# Patient Record
Sex: MALE | Race: WHITE | Employment: OTHER | URBAN - METROPOLITAN AREA
[De-identification: names, ages, dates, MRNs, and addresses within clinical notes are randomized per-mention and may not be internally consistent; named-entity substitution may affect disease eponyms.]

---

## 2017-04-18 PROBLEM — R30.0 DYSURIA: Status: ACTIVE | Noted: 2017-04-18

## 2017-04-18 PROBLEM — R97.20 ELEVATED PROSTATE SPECIFIC ANTIGEN (PSA): Status: ACTIVE | Noted: 2017-04-18

## 2017-04-18 PROBLEM — N52.9 IMPOTENCE OF ORGANIC ORIGIN: Status: ACTIVE | Noted: 2017-04-18

## 2017-04-18 PROBLEM — N39.0 URINARY TRACT INFECTION, SITE NOT SPECIFIED: Status: ACTIVE | Noted: 2017-04-18

## 2017-04-18 PROBLEM — N20.0 CALCULUS OF KIDNEY: Status: ACTIVE | Noted: 2017-04-18

## 2017-04-18 PROBLEM — R33.9 RETENTION OF URINE, UNSPECIFIED: Status: ACTIVE | Noted: 2017-04-18

## 2017-04-18 PROBLEM — R31.0 GROSS HEMATURIA: Status: ACTIVE | Noted: 2017-04-18

## 2017-04-18 PROBLEM — N39.41 URGE INCONTINENCE: Status: ACTIVE | Noted: 2017-04-18

## 2017-04-18 PROBLEM — N40.1 BPH WITH OBSTRUCTION/LOWER URINARY TRACT SYMPTOMS: Status: ACTIVE | Noted: 2017-04-18

## 2017-04-18 PROBLEM — N13.8 BPH WITH OBSTRUCTION/LOWER URINARY TRACT SYMPTOMS: Status: ACTIVE | Noted: 2017-04-18

## 2017-04-18 PROBLEM — R10.9 ABDOMINAL PAIN, UNSPECIFIED SITE: Status: ACTIVE | Noted: 2017-04-18

## 2017-04-18 PROBLEM — R35.1 NOCTURIA: Status: ACTIVE | Noted: 2017-04-18

## 2018-04-12 PROBLEM — N39.0 URINARY TRACT INFECTION: Status: RESOLVED | Noted: 2017-04-18 | Resolved: 2018-04-12

## 2020-02-05 ENCOUNTER — APPOINTMENT (OUTPATIENT)
Dept: CT IMAGING | Age: 80
End: 2020-02-05
Payer: MEDICARE

## 2020-02-05 ENCOUNTER — HOSPITAL ENCOUNTER (EMERGENCY)
Age: 80
Discharge: PSYCHIATRIC OR SUBSTANCE USE FACILITY | End: 2020-02-06
Attending: EMERGENCY MEDICINE
Payer: MEDICARE

## 2020-02-05 LAB
A/G RATIO: 1.4 (ref 1.1–2.2)
ALBUMIN SERPL-MCNC: 4.2 G/DL (ref 3.4–5)
ALP BLD-CCNC: 86 U/L (ref 40–129)
ALT SERPL-CCNC: 8 U/L (ref 10–40)
AMPHETAMINE SCREEN, URINE: NORMAL
ANION GAP SERPL CALCULATED.3IONS-SCNC: 8 MMOL/L (ref 3–16)
AST SERPL-CCNC: 18 U/L (ref 15–37)
BARBITURATE SCREEN URINE: NORMAL
BASOPHILS ABSOLUTE: 0 K/UL (ref 0–0.2)
BASOPHILS RELATIVE PERCENT: 0.4 %
BENZODIAZEPINE SCREEN, URINE: NORMAL
BILIRUB SERPL-MCNC: 1 MG/DL (ref 0–1)
BILIRUBIN URINE: NEGATIVE
BLOOD, URINE: NEGATIVE
BUN BLDV-MCNC: 24 MG/DL (ref 7–20)
CALCIUM SERPL-MCNC: 9.7 MG/DL (ref 8.3–10.6)
CANNABINOID SCREEN URINE: NORMAL
CHLORIDE BLD-SCNC: 99 MMOL/L (ref 99–110)
CLARITY: CLEAR
CO2: 27 MMOL/L (ref 21–32)
COCAINE METABOLITE SCREEN URINE: NORMAL
COLOR: YELLOW
CREAT SERPL-MCNC: 1.3 MG/DL (ref 0.8–1.3)
EOSINOPHILS ABSOLUTE: 0.3 K/UL (ref 0–0.6)
EOSINOPHILS RELATIVE PERCENT: 3.2 %
GFR AFRICAN AMERICAN: >60
GFR NON-AFRICAN AMERICAN: 53
GLOBULIN: 2.9 G/DL
GLUCOSE BLD-MCNC: 114 MG/DL (ref 70–99)
GLUCOSE URINE: NEGATIVE MG/DL
HCT VFR BLD CALC: 40.4 % (ref 40.5–52.5)
HEMOGLOBIN: 13.4 G/DL (ref 13.5–17.5)
KETONES, URINE: NEGATIVE MG/DL
LEUKOCYTE ESTERASE, URINE: NEGATIVE
LYMPHOCYTES ABSOLUTE: 0.9 K/UL (ref 1–5.1)
LYMPHOCYTES RELATIVE PERCENT: 11 %
Lab: NORMAL
MCH RBC QN AUTO: 29.5 PG (ref 26–34)
MCHC RBC AUTO-ENTMCNC: 33.3 G/DL (ref 31–36)
MCV RBC AUTO: 88.7 FL (ref 80–100)
METHADONE SCREEN, URINE: NORMAL
MICROSCOPIC EXAMINATION: NORMAL
MONOCYTES ABSOLUTE: 0.6 K/UL (ref 0–1.3)
MONOCYTES RELATIVE PERCENT: 6.8 %
NEUTROPHILS ABSOLUTE: 6.5 K/UL (ref 1.7–7.7)
NEUTROPHILS RELATIVE PERCENT: 78.6 %
NITRITE, URINE: NEGATIVE
OPIATE SCREEN URINE: NORMAL
OXYCODONE URINE: NORMAL
PDW BLD-RTO: 14.8 % (ref 12.4–15.4)
PH UA: 7
PH UA: 7 (ref 5–8)
PHENCYCLIDINE SCREEN URINE: NORMAL
PLATELET # BLD: 214 K/UL (ref 135–450)
PMV BLD AUTO: 7.8 FL (ref 5–10.5)
POTASSIUM REFLEX MAGNESIUM: 3.6 MMOL/L (ref 3.5–5.1)
PROPOXYPHENE SCREEN: NORMAL
PROTEIN UA: NEGATIVE MG/DL
RBC # BLD: 4.56 M/UL (ref 4.2–5.9)
SODIUM BLD-SCNC: 134 MMOL/L (ref 136–145)
SPECIFIC GRAVITY UA: 1.02 (ref 1–1.03)
TOTAL PROTEIN: 7.1 G/DL (ref 6.4–8.2)
URINE TYPE: NORMAL
UROBILINOGEN, URINE: 0.2 E.U./DL
WBC # BLD: 8.3 K/UL (ref 4–11)

## 2020-02-05 PROCEDURE — 81003 URINALYSIS AUTO W/O SCOPE: CPT

## 2020-02-05 PROCEDURE — 80053 COMPREHEN METABOLIC PANEL: CPT

## 2020-02-05 PROCEDURE — 85025 COMPLETE CBC W/AUTO DIFF WBC: CPT

## 2020-02-05 PROCEDURE — G0480 DRUG TEST DEF 1-7 CLASSES: HCPCS

## 2020-02-05 PROCEDURE — 12011 RPR F/E/E/N/L/M 2.5 CM/<: CPT

## 2020-02-05 PROCEDURE — 80307 DRUG TEST PRSMV CHEM ANLYZR: CPT

## 2020-02-05 PROCEDURE — 70450 CT HEAD/BRAIN W/O DYE: CPT

## 2020-02-05 PROCEDURE — 36415 COLL VENOUS BLD VENIPUNCTURE: CPT

## 2020-02-05 PROCEDURE — 99285 EMERGENCY DEPT VISIT HI MDM: CPT

## 2020-02-05 RX ORDER — ACETAMINOPHEN 325 MG/1
650 TABLET ORAL ONCE
Status: DISCONTINUED | OUTPATIENT
Start: 2020-02-05 | End: 2020-02-06 | Stop reason: HOSPADM

## 2020-02-05 RX ORDER — LIDOCAINE HYDROCHLORIDE AND EPINEPHRINE 10; 10 MG/ML; UG/ML
20 INJECTION, SOLUTION INFILTRATION; PERINEURAL ONCE
Status: COMPLETED | OUTPATIENT
Start: 2020-02-05 | End: 2020-02-06

## 2020-02-05 ASSESSMENT — ENCOUNTER SYMPTOMS
RESPIRATORY NEGATIVE: 1
BACK PAIN: 0
EYES NEGATIVE: 1

## 2020-02-06 ENCOUNTER — HOSPITAL ENCOUNTER (INPATIENT)
Age: 80
LOS: 9 days | Discharge: NURSING, REHAB OR LONG-TERM CARE | DRG: 057 | End: 2020-02-15
Attending: PSYCHIATRY & NEUROLOGY | Admitting: PSYCHIATRY & NEUROLOGY
Payer: MEDICARE

## 2020-02-06 VITALS
SYSTOLIC BLOOD PRESSURE: 174 MMHG | RESPIRATION RATE: 18 BRPM | TEMPERATURE: 97.9 F | DIASTOLIC BLOOD PRESSURE: 97 MMHG | OXYGEN SATURATION: 97 % | HEART RATE: 80 BPM | BODY MASS INDEX: 21.02 KG/M2 | WEIGHT: 155 LBS

## 2020-02-06 PROBLEM — G30.9 ALZHEIMER'S DEMENTIA WITH BEHAVIORAL DISTURBANCE (HCC): Status: ACTIVE | Noted: 2020-02-06

## 2020-02-06 PROBLEM — F02.818 ALZHEIMER'S DEMENTIA WITH BEHAVIORAL DISTURBANCE (HCC): Status: ACTIVE | Noted: 2020-02-06

## 2020-02-06 PROBLEM — N40.0 BENIGN PROSTATIC HYPERPLASIA: Status: ACTIVE | Noted: 2017-04-18

## 2020-02-06 LAB
ACETAMINOPHEN LEVEL: <5 UG/ML (ref 10–30)
EKG ATRIAL RATE: 65 BPM
EKG DIAGNOSIS: NORMAL
EKG P AXIS: 50 DEGREES
EKG P-R INTERVAL: 160 MS
EKG Q-T INTERVAL: 458 MS
EKG QRS DURATION: 142 MS
EKG QTC CALCULATION (BAZETT): 476 MS
EKG R AXIS: -72 DEGREES
EKG T AXIS: 43 DEGREES
EKG VENTRICULAR RATE: 65 BPM
ETHANOL: NORMAL MG/DL (ref 0–0.08)
SALICYLATE, SERUM: <0.3 MG/DL (ref 15–30)

## 2020-02-06 PROCEDURE — 93010 ELECTROCARDIOGRAM REPORT: CPT | Performed by: INTERNAL MEDICINE

## 2020-02-06 PROCEDURE — 92610 EVALUATE SWALLOWING FUNCTION: CPT

## 2020-02-06 PROCEDURE — 92526 ORAL FUNCTION THERAPY: CPT

## 2020-02-06 PROCEDURE — 97165 OT EVAL LOW COMPLEX 30 MIN: CPT

## 2020-02-06 PROCEDURE — 97535 SELF CARE MNGMENT TRAINING: CPT

## 2020-02-06 PROCEDURE — 2500000003 HC RX 250 WO HCPCS: Performed by: NURSE PRACTITIONER

## 2020-02-06 PROCEDURE — 99222 1ST HOSP IP/OBS MODERATE 55: CPT | Performed by: PHYSICIAN ASSISTANT

## 2020-02-06 PROCEDURE — 93005 ELECTROCARDIOGRAM TRACING: CPT | Performed by: PSYCHIATRY & NEUROLOGY

## 2020-02-06 PROCEDURE — 6370000000 HC RX 637 (ALT 250 FOR IP): Performed by: NURSE PRACTITIONER

## 2020-02-06 PROCEDURE — 1240000000 HC EMOTIONAL WELLNESS R&B

## 2020-02-06 PROCEDURE — 99223 1ST HOSP IP/OBS HIGH 75: CPT | Performed by: PSYCHIATRY & NEUROLOGY

## 2020-02-06 PROCEDURE — 6370000000 HC RX 637 (ALT 250 FOR IP): Performed by: PSYCHIATRY & NEUROLOGY

## 2020-02-06 RX ORDER — HALOPERIDOL 1 MG/1
2 TABLET ORAL EVERY 6 HOURS PRN
Status: DISCONTINUED | OUTPATIENT
Start: 2020-02-06 | End: 2020-02-15 | Stop reason: HOSPADM

## 2020-02-06 RX ORDER — BENZTROPINE MESYLATE 1 MG/ML
1 INJECTION INTRAMUSCULAR; INTRAVENOUS 2 TIMES DAILY PRN
Status: DISCONTINUED | OUTPATIENT
Start: 2020-02-06 | End: 2020-02-15 | Stop reason: HOSPADM

## 2020-02-06 RX ORDER — TERAZOSIN 5 MG/1
10 CAPSULE ORAL DAILY
Status: DISCONTINUED | OUTPATIENT
Start: 2020-02-07 | End: 2020-02-15 | Stop reason: HOSPADM

## 2020-02-06 RX ORDER — HALOPERIDOL 5 MG/ML
2 INJECTION INTRAMUSCULAR EVERY 6 HOURS PRN
Status: DISCONTINUED | OUTPATIENT
Start: 2020-02-06 | End: 2020-02-15 | Stop reason: HOSPADM

## 2020-02-06 RX ORDER — DIVALPROEX SODIUM 250 MG/1
250 TABLET, DELAYED RELEASE ORAL NIGHTLY
Status: ON HOLD | COMMUNITY
End: 2020-02-15 | Stop reason: HOSPADM

## 2020-02-06 RX ORDER — LORAZEPAM 0.5 MG/1
0.5 TABLET ORAL EVERY 6 HOURS PRN
Status: DISCONTINUED | OUTPATIENT
Start: 2020-02-06 | End: 2020-02-15 | Stop reason: HOSPADM

## 2020-02-06 RX ORDER — DIVALPROEX SODIUM 500 MG/1
500 TABLET, DELAYED RELEASE ORAL
Status: DISCONTINUED | OUTPATIENT
Start: 2020-02-06 | End: 2020-02-15 | Stop reason: HOSPADM

## 2020-02-06 RX ORDER — DIVALPROEX SODIUM 250 MG/1
250 TABLET, DELAYED RELEASE ORAL DAILY
Status: DISCONTINUED | OUTPATIENT
Start: 2020-02-06 | End: 2020-02-15 | Stop reason: HOSPADM

## 2020-02-06 RX ORDER — MAGNESIUM HYDROXIDE/ALUMINUM HYDROXICE/SIMETHICONE 120; 1200; 1200 MG/30ML; MG/30ML; MG/30ML
30 SUSPENSION ORAL EVERY 6 HOURS PRN
Status: DISCONTINUED | OUTPATIENT
Start: 2020-02-06 | End: 2020-02-15 | Stop reason: HOSPADM

## 2020-02-06 RX ORDER — BACITRACIN ZINC AND POLYMYXIN B SULFATE 500; 1000 [USP'U]/G; [USP'U]/G
OINTMENT TOPICAL ONCE
Status: COMPLETED | OUTPATIENT
Start: 2020-02-06 | End: 2020-02-06

## 2020-02-06 RX ORDER — DUTASTERIDE 0.5 MG/1
0.5 CAPSULE, LIQUID FILLED ORAL DAILY
Status: ON HOLD | COMMUNITY
End: 2020-02-15 | Stop reason: HOSPADM

## 2020-02-06 RX ORDER — MEMANTINE HYDROCHLORIDE 5 MG/1
10 TABLET ORAL
Status: DISCONTINUED | OUTPATIENT
Start: 2020-02-06 | End: 2020-02-15 | Stop reason: HOSPADM

## 2020-02-06 RX ORDER — ACETAMINOPHEN 325 MG/1
650 TABLET ORAL EVERY 4 HOURS PRN
Status: DISCONTINUED | OUTPATIENT
Start: 2020-02-06 | End: 2020-02-15 | Stop reason: HOSPADM

## 2020-02-06 RX ORDER — LORAZEPAM 2 MG/ML
1 INJECTION INTRAMUSCULAR EVERY 6 HOURS PRN
Status: DISCONTINUED | OUTPATIENT
Start: 2020-02-06 | End: 2020-02-15 | Stop reason: HOSPADM

## 2020-02-06 RX ORDER — DIVALPROEX SODIUM 250 MG/1
250 TABLET, DELAYED RELEASE ORAL 2 TIMES DAILY
Status: ON HOLD | COMMUNITY
End: 2020-02-15 | Stop reason: HOSPADM

## 2020-02-06 RX ORDER — TRAZODONE HYDROCHLORIDE 50 MG/1
25 TABLET ORAL NIGHTLY PRN
Status: DISCONTINUED | OUTPATIENT
Start: 2020-02-06 | End: 2020-02-15 | Stop reason: HOSPADM

## 2020-02-06 RX ORDER — TERAZOSIN 5 MG/1
5 CAPSULE ORAL 2 TIMES DAILY
Status: DISCONTINUED | OUTPATIENT
Start: 2020-02-06 | End: 2020-02-06

## 2020-02-06 RX ORDER — FINASTERIDE 5 MG/1
5 TABLET, FILM COATED ORAL DAILY
Status: DISCONTINUED | OUTPATIENT
Start: 2020-02-06 | End: 2020-02-15 | Stop reason: HOSPADM

## 2020-02-06 RX ORDER — MEMANTINE HYDROCHLORIDE 10 MG/1
10 TABLET ORAL 2 TIMES DAILY
Status: ON HOLD | COMMUNITY
End: 2020-02-15 | Stop reason: HOSPADM

## 2020-02-06 RX ADMIN — DIVALPROEX SODIUM 250 MG: 250 TABLET, DELAYED RELEASE ORAL at 11:01

## 2020-02-06 RX ADMIN — MEMANTINE 10 MG: 5 TABLET ORAL at 16:26

## 2020-02-06 RX ADMIN — FINASTERIDE 5 MG: 5 TABLET, FILM COATED ORAL at 11:01

## 2020-02-06 RX ADMIN — DIVALPROEX SODIUM 500 MG: 500 TABLET, DELAYED RELEASE ORAL at 16:26

## 2020-02-06 RX ADMIN — LIDOCAINE HYDROCHLORIDE,EPINEPHRINE BITARTRATE 20 ML: 10; .01 INJECTION, SOLUTION INFILTRATION; PERINEURAL at 00:06

## 2020-02-06 RX ADMIN — BACITRACIN ZINC AND POLYMYXIN B SULFATE: at 01:02

## 2020-02-06 ASSESSMENT — LIFESTYLE VARIABLES: HISTORY_ALCOHOL_USE: NO

## 2020-02-06 ASSESSMENT — PAIN SCALES - GENERAL
PAINLEVEL_OUTOF10: 0
PAINLEVEL_OUTOF10: 0

## 2020-02-06 NOTE — BH NOTE
Ludin Jacome was invited and encouraged to attend [id number] Recreation Group. Ludin Jacome declined invite to relax in bed and did not attend group.     Valdo Boucher, CTRS
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
## 2020-02-06 NOTE — PROGRESS NOTES
Occupational Therapy  Inpatient Occupational Therapy  Evaluation and Treatment    Unit:  [facility]  Date:  2020  Patient Name:    Adolfo Mueller  Admitting diagnosis:  Alzheimer's dementia with behavioral disturbance, unspecified timing of dementia onset ([facility] [G30.9, F02.81]  Admit Date:  2020  Precautions/Restrictions/WB Status/ Lines/ Wounds/ Oxygen: hepatitis, up as tolerated, fall risk, BHI prec. Treatment Time:  2956-8863  Treatment Number:  1    Patient Goals for Therapy: unknown; denies       Staff: Pt is an indep ambulator     Discharge Recommendations:  ? return to SNF    DME needs for discharge:        AM-PAC Score: 24     Home Health S4 Level: ? NA   ? Level 1- Standard  ? Level 2- Social  ? Level 3- Safety  ? Level 4- Sick    MOCA: TBA      Preadmission Environment:    Pt. Lives   ? [address] facility per chart    Preadmission Status / PLOF:  History of falls    ? No - denies fear of falling   Pt. Able to drive   ? No   Pt Fully independent for ADLs/IADLs. ? Yes    Pt. Required assistance from family for:  ?Cooking ? Cleaning  ? Laundry    Pt. Fully independent for transfers and gait and walked with: ?No Device  Sleep Hygiene:  \"I slept\"   Income: retired, \"I was in [employer]. \"   Financial Management:  Reports self; denies difficulty with budgeting finances   Leisure Interests:  Jogging, 'staying active,'  Medication Management: unknown   Health Management: denies actively seeing a therapist, psychiatrist, or PCP, \"I am seeing no one\"   Social Network:  Unknown, \"I don't have family. I just have close people. \"   Stressors:  \"no, I just do my own thing\"  Coping Skills:  Denies needs     Pain  ? No  Rating:  Location:  Pain Medicine Status: ? N/A    Cognition    A&Ox1; able to identify month, name and    patient appropriate and cooperative --- needs cues for encouragement to engage in participation   Follows  ? 2 step commands.   Nonsensical responses  [place]   Somewhat guarded with responses Upper Extremity ROM:    WFL, pt able to perform all bed mobility, transfers, and gait without ROM limitation. Upper Extremity Strength:    WFL, pt able to perform all bed mobility, transfers, and gait without strength limitation. Upper Extremity Sensation:    No apparent deficits. Upper Extremity Proprioception:    No apparent deficits. Skin Integrity:  Bandage covering forehead      Coordination and Tone:  Normal     Balance  Static Sitting:  ? Good   Dynamic Sitting:  ? Good  Static Standing: ? Good   Dynamic Standing: ? Good     Bed mobility:  Independent  Supine to sit:  Sit to supine:  Scooting to head of bed:  Scooting in sitting:  Rolling:  Bridging:    Transfers:  Independent  Sit to stand:  Stand to sit:  Bed/Chair to/from toilet:    Self Care: Independent per PCA and RN; refused to complete during eval   Toileting:  Grooming:  Dressing:    Exercise / Activities Initiated:   Pt. Educated on role of OT. Pt. Participated in: ADL re-training, OT eval, and safety. Assessment of Deficits:   Pt demonstrated decreased cognition, decreased safety awareness, and decreased ADL/IADL status. Pt intermittently 'laughed' at performing certain tasks, \"Nah, I don't need that. \" Appears to be in denial of needs and limitations, \"I can refuse if I want to. \"    Pt. Limited during evaluation by decreased cognition and insight     At end of evaluation, pt. In room. Goal(s) :   To be met in 3 Visits:  1). Pt to complete MOCA     To be met in 5 Visits:  1).  Pt. To complete interest checklist.    2). Pt. To verbalize 3 coping skills. 3). Pt. To demo ability to read prescription bottle  4). Pt. To verbalize understanding of sleep hygiene education.   5). Pt. To verbalize understanding of 3 communication skills. 6). Pt. To complete daily schedule of healthy activities/routines . 7). Pt. To complete wellness plan. Rehabilitation Potential:  Good for goals listed above.     Strengths for achieving goals include:  PLOF  Barriers to achieving goals include: decreased cognition      Plan: To be seen 2-5x/week while in acute care setting for therapeutic exercises/act, ADL retraining, NMR and patient/caregiver ed/instruction.      Timed Code Treatment Minutes:     23 minutes    Total Treatment Time:     33  minutes    Signature and License Number    Magdavictoria Vilma OTR/L  #[id number]      If patient discharges from this facility prior to next visit, this note will serve as the Discharge Summary
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
## 2020-02-06 NOTE — ED NOTES
Report called to Rojas Aviles RN at [facility]. Pt to be transferred to facility by [place] ambulance.  ETA 0500     Priscila Donohue, RN  02/06/20 7190

## 2020-02-06 NOTE — PLAN OF CARE
Problem: Nutrition  Intervention: Swallowing evaluation  Note:   SLP completed evaluation. Please refer to notes in EMR. Problem: Nutrition  Intervention: Aspiration precautions  Note:   SLP completed evaluation. Please refer to notes in EMR.     Sara Herron M.A., [address] [facility] #[id number]  Speech-Language Pathologist

## 2020-02-06 NOTE — BH NOTE
roadblocks)                    ( )  Coping skills (new ways to manage stress, exercise, relaxation techniques, changing routine, distraction)                                                           ( )  Basic information about quitting (benefits of quitting, techniques in how to quit, available resources  ( ) Referral for counseling faxed to Sarah                                           ( ) Patient refused counseling  ( ) Patient has not smoked in the last 30 days    Metabolic Screening:    No results found for: LABA1C    No results for input(s): CHOL, TRIG, HDL, LDLCALC, LABVLDL in the last 72 hours. Body mass index is 23.82 kg/m². BP Readings from Last 2 Encounters:   02/06/20 (!) 154/81   02/06/20 (!) 174/97           Pt admitted with followings belongings:  Clothing: Footwear, Jacket / coat, Pants, Shirt, Socks, Undergarments (Comment)  Were All Patient Medications Collected?: Not Applicable  Other Valuables: None     Valuables sent home with Life alert in locker Valuables placed in safe in security envelope, number:  N/A. Patient's home medications were N/A. Patient oriented to surroundings and program expectations and copy of patient rights given. Received admission packet:  N/A. Consents reviewed, signed N/A. Refused No. Patient verbalize understanding:  No    Patient education on precautions: Falls            Pt admitted to room via stretcher from [facility] He was oriented to room and unit. He is confused and some answers are confused. He has poor short term menory loss and some confabulation as to the incident that brought him to the [address]. He is a fairly good historian.         Patty Hope RN
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
## 2020-02-06 NOTE — BH NOTE
Writer called [employer] and was transferred to [employer]. Writer left message requesting call back and requesting for a site visit to be done on Monday 2/10 to evaluate this patient.     [facility] [phone]
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
## 2020-02-06 NOTE — ED NOTES
Bed: A09-09  Expected date:   Expected time:   Means of arrival:   Comments:  Medic [id number] Michelle Stevens RN  02/05/20 2124
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
## 2020-02-06 NOTE — BH NOTE
4 Eyes Skin Assessment     The patient is being assess for  Admission    I agree that 2 RN's have performed a thorough Head to Toe Skin Assessment on the patient. ALL assessment sites listed below have been assessed. Areas assessed by both nurses:   [x]   Head, Face, and Ears   [x]   Shoulders, Back, and Chest  [x]   Arms, Elbows, and Hands   [x]   Coccyx, Sacrum, and Ischum  [x]   Legs, Feet, and Heels        Does the Patient have Skin Breakdown?   No         Enrique Prevention initiated:  No   Wound Care Orders initiated:  No      [facility] nurse consulted for Pressure Injury (Stage 3,4, Unstageable, DTI, NWPT, and Complex wounds):  No      Nurse 1 eSignature: Electronically signed by Trevon Isaacs RN on 2/6/20 at 6:48 AM    **SHARE this note so that the co-signing nurse is able to place an eSignature**    Nurse 2 eSignature: Electronically signed by Hector Lewis RN on 2/6/20 at 6:52 AM

## 2020-02-07 LAB
CHOLESTEROL, TOTAL: 162 MG/DL (ref 0–199)
ESTIMATED AVERAGE GLUCOSE: 105.4 MG/DL
HBA1C MFR BLD: 5.3 %
HDLC SERPL-MCNC: 58 MG/DL (ref 40–60)
LDL CHOLESTEROL CALCULATED: 93 MG/DL
TRIGL SERPL-MCNC: 55 MG/DL (ref 0–150)
VLDLC SERPL CALC-MCNC: 11 MG/DL

## 2020-02-07 PROCEDURE — 36415 COLL VENOUS BLD VENIPUNCTURE: CPT

## 2020-02-07 PROCEDURE — 1240000000 HC EMOTIONAL WELLNESS R&B

## 2020-02-07 PROCEDURE — 90833 PSYTX W PT W E/M 30 MIN: CPT | Performed by: PSYCHIATRY & NEUROLOGY

## 2020-02-07 PROCEDURE — 99232 SBSQ HOSP IP/OBS MODERATE 35: CPT | Performed by: PSYCHIATRY & NEUROLOGY

## 2020-02-07 PROCEDURE — 80061 LIPID PANEL: CPT

## 2020-02-07 PROCEDURE — 99231 SBSQ HOSP IP/OBS SF/LOW 25: CPT | Performed by: PHYSICIAN ASSISTANT

## 2020-02-07 PROCEDURE — 6370000000 HC RX 637 (ALT 250 FOR IP): Performed by: PSYCHIATRY & NEUROLOGY

## 2020-02-07 PROCEDURE — 83036 HEMOGLOBIN GLYCOSYLATED A1C: CPT

## 2020-02-07 RX ORDER — RISPERIDONE 0.25 MG/1
0.25 TABLET, FILM COATED ORAL
Status: DISCONTINUED | OUTPATIENT
Start: 2020-02-07 | End: 2020-02-15 | Stop reason: HOSPADM

## 2020-02-07 RX ADMIN — MEMANTINE 10 MG: 5 TABLET ORAL at 17:13

## 2020-02-07 RX ADMIN — TERAZOSIN HYDROCHLORIDE ANHYDROUS 10 MG: 5 CAPSULE ORAL at 08:04

## 2020-02-07 RX ADMIN — DIVALPROEX SODIUM 500 MG: 500 TABLET, DELAYED RELEASE ORAL at 17:13

## 2020-02-07 RX ADMIN — MEMANTINE 10 MG: 5 TABLET ORAL at 08:04

## 2020-02-07 RX ADMIN — FINASTERIDE 5 MG: 5 TABLET, FILM COATED ORAL at 08:04

## 2020-02-07 RX ADMIN — RISPERIDONE 0.25 MG: 0.25 TABLET ORAL at 17:13

## 2020-02-07 RX ADMIN — DIVALPROEX SODIUM 250 MG: 250 TABLET, DELAYED RELEASE ORAL at 08:04

## 2020-02-07 NOTE — PLAN OF CARE
Pt in bed sleeping most of the shift. He awakens at times to use the bathroom and then goes back to sleep. He declines snacks this evening. He remains confused with confused conversation. He answers some questions appropriately. He continues to discuss he past  history. He is oriented to self only. He has had no angry outbursts. He remains labile and completes few sentences appropriately. He denies SI/HI/AVH. Not noted to be RTIS.

## 2020-02-07 NOTE — BH NOTE
SW called and let a VM for Alejandro Lima the . SW left a VM about discharge on Monday and requested a call back. Provider called again and spoke with Per Dodson reported the person SW needed to speak with is Peyton Ayala and he would relay the message to her and request she call me back ASAP. Lulu Mejia reported Peyton Ayala can be reached at [phone] ext. [phone].           Nithya, KIMMYW-S
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
## 2020-02-07 NOTE — PROGRESS NOTES
Progress Note    Admit Date:  2/6/2020    Subjective:  Mr. Chanelle Palm denies any concerns. Objective:   Patient Vitals for the past 4 hrs:   BP Temp Temp src Pulse Resp SpO2   02/07/20 0840 (!) 150/86 98.6 °F (37 °C) Oral 80 16 93 %   02/07/20 0811 -- 98.6 °F (37 °C) -- 80 16 97 %   02/07/20 0804 (!) 150/86 -- -- -- -- --            Intake/Output Summary (Last 24 hours) at 2/7/2020 1129  Last data filed at 2/6/2020 1302  Gross per 24 hour   Intake 480 ml   Output --   Net 480 ml       Physical Exam:  Gen: No distress. Alert. Right forehead laceration, pleasant elderly  male  Eyes: PERRL. No sclera icterus. No conjunctival injection. ENT: No discharge. Pharynx clear. Neck:  Trachea midline. Resp: No accessory muscle use. No crackles. No wheezes. No rhonchi. CV: Regular rate. Regular rhythm. No murmur. No rub. 1-2+ bilateral pitting edema. GI: Non-tender. Non-distended. Normal bowel sounds. No hernia. Skin: Warm and dry. No rash on exposed extremities. Right 2 cm forehead lac with sutures in place  M/S: No cyanosis. No clubbing. Neuro: Awake.  Grossly nonfocal, CN II-XII intact    Psych: Oriented x 1 - self only    Scheduled Meds:   finasteride  5 mg Oral Daily    divalproex  250 mg Oral Daily    And    divalproex  500 mg Oral Daily    terazosin  10 mg Oral Daily    memantine  10 mg Oral 2 times per day         PRN Meds:  acetaminophen, LORazepam **OR** LORazepam, haloperidol lactate **OR** haloperidol, traZODone, benztropine mesylate, magnesium hydroxide, aluminum & magnesium hydroxide-simethicone      Data:  CBC:   Recent Labs     02/05/20  2314   WBC 8.3   HGB 13.4*   HCT 40.4*   MCV 88.7        BMP:   Recent Labs     02/05/20  2314   *   K 3.6   CL 99   CO2 27   BUN 24*   CREATININE 1.3     LIVER PROFILE:   Recent Labs     02/05/20  2314   AST 18   ALT 8*   BILITOT 1.0   ALKPHOS 86       Assessment/Plan:     Dementia with Behavioral Disturbances  - cont mgmt per I     Right

## 2020-02-08 PROCEDURE — 6370000000 HC RX 637 (ALT 250 FOR IP): Performed by: PSYCHIATRY & NEUROLOGY

## 2020-02-08 PROCEDURE — 1240000000 HC EMOTIONAL WELLNESS R&B

## 2020-02-08 PROCEDURE — 92526 ORAL FUNCTION THERAPY: CPT

## 2020-02-08 RX ADMIN — TERAZOSIN HYDROCHLORIDE ANHYDROUS 10 MG: 5 CAPSULE ORAL at 08:23

## 2020-02-08 RX ADMIN — MEMANTINE 10 MG: 5 TABLET ORAL at 08:23

## 2020-02-08 RX ADMIN — DIVALPROEX SODIUM 500 MG: 500 TABLET, DELAYED RELEASE ORAL at 16:53

## 2020-02-08 RX ADMIN — DIVALPROEX SODIUM 250 MG: 250 TABLET, DELAYED RELEASE ORAL at 08:23

## 2020-02-08 RX ADMIN — MEMANTINE 10 MG: 5 TABLET ORAL at 16:53

## 2020-02-08 RX ADMIN — FINASTERIDE 5 MG: 5 TABLET, FILM COATED ORAL at 08:23

## 2020-02-08 RX ADMIN — RISPERIDONE 0.25 MG: 0.25 TABLET ORAL at 08:23

## 2020-02-08 RX ADMIN — RISPERIDONE 0.25 MG: 0.25 TABLET ORAL at 16:53

## 2020-02-08 NOTE — PROGRESS NOTES
Speech Language Pathology  Facility/Department: [address] [place] [address]  Dysphagia Daily Treatment Note    NAME: Aleyda Alvarez  : 1940  MRN: [id number]    RECOMMENDATIONS  1. IDDSI level 6 soft and bite sized solids with thin liquids. 2. Monitor during intake given impulsivity  3. Monitor lungs closely  4. Safe swallow precautions   Small bites/sips   Slow rate   Alternate bite with drink  5. SLP to follow to ensure tolerance. Hold PO and contact SLP if s/s of aspiration develop. Patient Diagnosis(es):   Patient Active Problem List    Diagnosis Date Noted    Alzheimer's dementia with behavioral disturbance ([facility] [address] 2020    Hepatitis     Hx of laryngeal cancer     Laceration of forehead     Gross hematuria 2017    Calculus of kidney 2017    Retention of urine 2017    Dysuria 2017    Nocturia 2017    Urge incontinence 2017    Abdominal pain 2017    Benign prostatic hyperplasia 2017    Elevated prostate specific antigen (PSA) 2017    Impotence of organic origin 2017     Allergies: No Known Allergies  Onset Date: 2020  Current Diet Level:  Regular solids, thin liquids    Pain: None reported    Subjective: Pt was pleasant and agreeable to dysphagia tx. Current Diet: DIET GENERAL    Diet Tolerance:  Patient tolerating current diet level without signs/symptoms of penetration / aspiration. RN reports good tolerance of meds whole with liquid and ballesteros with Maori toast this AM.    P.O. Trials: Thin   X  X 3 by straw   Nectar       Honey       Puree       Solid   X Chunks of chicken x 8, baked beans x 8, corn pudding x 5       Dysphagia Treatment and Impressions: Pt seen for dysphagia tx with lunch meal in dining room. Diet remains regular solids. Dysphagia III (soft and bite-sized) was recommended on 20. Pt was very pleasant and talkative.  Observed pt consume the 1st 1/2 of his meal including chicken chunks, baked beans, corn pudding, and thin liquids by straw. Pt denies dysphagia. Pt noted to be impulsive taking several bites at one time. Reduced oral clearance in b/w bites. Suspected reduced bolus formation at times with solids d/t fast rate and pt often attempting to talk with bolus in oral cavity. He benefited from cues for slowed rate and to refrain from talking while eating. 1 instance where pt started to cough and stated he had a piece of food stuck in his throat (suspect d/t reduced bolus formation); vocal quality was clear and pt swallowed a second time to relieve. Double swallows noted with thin liquids. Mild and inconsistent TC'ing during meal, difficult to determine if it was related to the swallow. Pt also had throat clearing and subtle coughing prior to meal. No recent chest imaging. Pt appears to be tolerating diet without overt difficulty or aspiration per chart review, RN report, and pt report. Dysphagia Goals:  Timeframe for Long-term Goals: 7 days (02/13/2020)  Goal 1: The patient will tolerate least restrictive diet with no clinical s/s of aspiration for optimal nutrition and hydration. -02/08-goal addressed, ongoing, see above     Short-term Goals  Timeframe for Short-term Goals: 5 days (02/11/2020)  Goal 1: The patient will tolerate recommended diet with no clinical s/s of aspiration 5/5-02/08-goal addressed, ongoing, see above  Goal 2: The patient/caregivers will demonstrate understanding of recommended compensatory swallow strategies, for improved swallow safety-02/08-goal addressed, ongoing, see above     Recommendations:  Solid Consistency: IDDSI level 6 soft and bite sized solids  Liquid Consistency:  Thin liquids  Medication: Meds whole with thin liquid (1-2 at a time)    Patient/Family/Caregiver Education: Educated RN on diet recommendations    Compensatory Strategies:  Compensatory Swallowing Strategies:   - Alternate solids and liquids  - Small bites/sips  - Eat/Feed slowly  -

## 2020-02-09 LAB
GLUCOSE BLD-MCNC: 260 MG/DL (ref 70–99)
PERFORMED ON: ABNORMAL

## 2020-02-09 PROCEDURE — 99233 SBSQ HOSP IP/OBS HIGH 50: CPT | Performed by: PSYCHIATRY & NEUROLOGY

## 2020-02-09 PROCEDURE — 1240000000 HC EMOTIONAL WELLNESS R&B

## 2020-02-09 PROCEDURE — 6370000000 HC RX 637 (ALT 250 FOR IP): Performed by: PSYCHIATRY & NEUROLOGY

## 2020-02-09 RX ADMIN — MEMANTINE 10 MG: 5 TABLET ORAL at 16:05

## 2020-02-09 RX ADMIN — DIVALPROEX SODIUM 500 MG: 500 TABLET, DELAYED RELEASE ORAL at 16:05

## 2020-02-09 RX ADMIN — TERAZOSIN HYDROCHLORIDE ANHYDROUS 10 MG: 5 CAPSULE ORAL at 07:55

## 2020-02-09 RX ADMIN — DIVALPROEX SODIUM 250 MG: 250 TABLET, DELAYED RELEASE ORAL at 07:55

## 2020-02-09 RX ADMIN — RISPERIDONE 0.25 MG: 0.25 TABLET ORAL at 16:05

## 2020-02-09 RX ADMIN — FINASTERIDE 5 MG: 5 TABLET, FILM COATED ORAL at 07:54

## 2020-02-09 RX ADMIN — MEMANTINE 10 MG: 5 TABLET ORAL at 07:55

## 2020-02-09 RX ADMIN — RISPERIDONE 0.25 MG: 0.25 TABLET ORAL at 07:55

## 2020-02-09 NOTE — PLAN OF CARE
Problem: Anger Management/Homicidal Ideation:  Goal: Able to display appropriate communication and problem solving  Description  Able to display appropriate communication and problem solving  2/9/2020 0153 by Octaviano Hamilton RN  Outcome: Ongoing    Problem: Altered Mood, Deterioration in Function:  Goal: Ability to perform activities of daily living will improve  Description  Ability to perform activities of daily living will improve  Outcome: Ongoing     Problem: Coping:  Goal: Ability to remain calm will improve  Description  Ability to remain calm will improve  Outcome: Ongoing     Problem: Safety:  Goal: Ability to remain free from injury will improve  Description  Ability to remain free from injury will improve  Outcome: Ongoing     Problem: Discharge Planning:  Goal: Ability to perform activities of daily living will improve  Description  Ability to perform activities of daily living will improve  Outcome: Ongoing  Patient has been in bed, sleeping/resting all of shift. Unable to complete interview. Patient received no PRN medications thus far this shift. No s/s of distress noted. No behaviors or angry outburst observed. Fall precautions in place. Call light within reach.

## 2020-02-09 NOTE — GROUP NOTE
Group Therapy Note    Date: 2/9/2020    Group Start Time: 0930  Group End Time: 2739  Group Topic: Psychoeducation    [facility]        Group Therapy Note    Attendees: 4         Patient's Goal:  Pt will participate in [employer]    Notes:  Pt participated in group activity and was appropriate. Status After Intervention:  Improved    Participation Level:  Active Listener and Interactive    Participation Quality: Appropriate and Sharing      Speech:  normal      Thought Process/Content: Logical      Affective Functioning: Congruent      Mood: euthymic      Level of consciousness:  Alert and Oriented x4      Response to Learning: Progressing to goal      Endings: None Reported    Modes of Intervention: Education, Socialization, Problem-solving and Activity      Discipline Responsible: /Counselor      Signature:  MICHAEL Nguyen

## 2020-02-09 NOTE — PROGRESS NOTES
Department of Psychiatry  Attending Progress Note  Chief Complaint: dementia  Lazarus Taylor was not willing to meet with me today . I had knocked on his door and he came toward the door and shut it. Sam [place] He then opened it and told me to [facility] along. \" He wanted to wait for his doctor tomorrow  Patient's chart was reviewed and collaborated with  about the treatment plan. SUBJECTIVE:    Patient is feeling NIKKI. Suicidal ideation:  none. Patient does not have medication side effects. ROS: Patient has new complaints: no  Sleeping adequately:  No:    Appetite adequate: Yes  Attending groups: Yes  Visitors:No    OBJECTIVEUTA    Physical  VITALS:  BP (!) 145/93   Pulse 86   Temp 97.8 °F (36.6 °C) (Oral)   Resp 16   Ht 6' (1.829 m)   Wt 175 lb 9.6 oz (79.7 kg)   SpO2 96%   BMI 23.82 kg/m²     Mental Status Examination:  Patients appearance was ill-appearing. Thoughts are Illogical. Homicidal ideations none. No abnormal movements, tics or mannerisms. Memory impaired Aims 0. Concentration Unable to Assess. Alert and oriented X 4. Insight and Judgement impaired insight. Patient was uncooperative.  Patient gait normal. Mood labile, affect labile affect Hallucinations NIKKI, suicidal ideations no specific plan to harm self Speech normal volume  Data  Labs:            Medications  Current Facility-Administered Medications: risperiDONE (RISPERDAL) tablet 0.25 mg, 0.25 mg, Oral, 2 times per day  acetaminophen (TYLENOL) tablet 650 mg, 650 mg, Oral, Q4H PRN  LORazepam (ATIVAN) tablet 0.5 mg, 0.5 mg, Oral, Q6H PRN **OR** LORazepam (ATIVAN) injection 1 mg, 1 mg, Intramuscular, Q6H PRN  haloperidol lactate (HALDOL) injection 2 mg, 2 mg, Intramuscular, Q6H PRN **OR** haloperidol (HALDOL) tablet 2 mg, 2 mg, Oral, Q6H PRN  traZODone (DESYREL) tablet 25 mg, 25 mg, Oral, Nightly PRN  benztropine mesylate (COGENTIN) injection 1 mg, 1 mg, Intramuscular, BID PRN  magnesium hydroxide (MILK OF MAGNESIA) 400 MG/5ML suspension 30 mL, 30

## 2020-02-09 NOTE — GROUP NOTE
Group Therapy Note    Date: 2/9/2020    Group Start Time: 0230  Group End Time: 0330  Group Topic: Psychoeducation    [facility]        Group Therapy Note    Attendees: 6         Patient's Goal:  Pt will participate in mediation. Notes:  Pt participated in mediatation and was able to identify why mediation is helpful for them. Pt had a difficult time staying in the moment. Pt said that the group was helpful because mediation prevented him from thinking about \"traumatizing thoughts\". Status After Intervention:  Improved    Participation Level:  Active Listener and Interactive    Participation Quality: Appropriate and Sharing      Speech:  normal      Thought Process/Content: Logical      Affective Functioning: Congruent      Mood: anxious and euthymic      Level of consciousness:  Alert and Oriented x4      Response to Learning: Able to verbalize current knowledge/experience, Able to verbalize/acknowledge new learning and Progressing to goal      Endings: None Reported    Modes of Intervention: Education, Support and Activity      Discipline Responsible: /Counselor      Signature:  MICHAEL Zhang

## 2020-02-09 NOTE — BH NOTE
[facility]  Day 3 Interdisciplinary Treatment Plan NOTE    Review Date & Time: 2/9/2020 1420    Patient was not in treatment team    Admission Type:   Admission Type: Involuntary    Reason for admission:  Reason for Admission: Confused and agressive with staff at [address]  Estimated Length of Stay Update:  1-2 days  Estimated Discharge Date Update: 2/10-2/11    PATIENT STRENGTHS:  Patient Strengths Strengths: Social Skills, Positive Support  Patient Strengths and Limitations:Limitations: Multiple barriers to leisure interests, Difficulty problem solving/relies on others to help solve problems  Addictive Behavior:Addictive Behavior  In the past 3 months, have you felt or has someone told you that you have a problem with:  : (NIKKI, pt unwilling to meet with SW.)  Do you have a history of Chemical Use?: (NIKKI, pt unwilling to meet with SW.)  Do you have a history of Alcohol Use?: (NIKKI, pt unwilling to meet with SW.)  Do you have a history of Street Drug Abuse?: (NIKKI, pt unwilling to meet with SW.)  Histroy of Prescripton Drug Abuse?: (NIKKI, pt unwilling to meet with SW.)  Medical Problems:  Past Medical History:   Diagnosis Date    Cancer ([facility] [address]     throat cancer     Elevated PSA     Hepatitis     Hypertrophy (benign) of prostate     Laryngeal cancer ([facility] [address]     Pneumonia     Skin infection, bacterial     rash on face        Risk:  Fall RiskTotal: 77  Enrique Scale Enrique Scale Score: 20  BVC Total: 1  Change in scores no.  Changes to plan of Care  no    Status EXAM:   Status and Exam  Normal: No  Facial Expression: Expressionless  Affect: Blunt  Level of Consciousness: Confused  Mood:Normal: No  Mood: Empty  Motor Activity:Normal: No  Motor Activity: Decreased  Interview Behavior: Cooperative  Preception: [place] to Person  Attention:Normal: No  Attention: Distractible, Unable to Concentrate  Thought Processes: Blocking  Thought Content:Normal: No  Thought Content: Poverty of Content  Hallucinations: None  Delusions: No  Delusions: Persecution  Memory:Normal: No  Memory: Poor Recent, Poor Remote  Insight and Judgment: No  Insight and Judgment: Poor Judgment, Poor Insight  Present Suicidal Ideation: No  Present Homicidal Ideation: No    Daily Assessment Last Entry:   Daily Sleep (WDL): Within Defined Limits         Patient Currently in Pain: Denies  Daily Nutrition (WDL): Within Defined Limits    Patient Monitoring:  Frequency of Checks: 4 times per hour, close    Psychiatric Symptoms:   Depression Symptoms  Depression Symptoms: Impaired concentration, Isolative  Anxiety Symptoms  Anxiety Symptoms: Generalized  Juliana Symptoms  Juliana Symptoms: No problems reported or observed.      Psychosis Symptoms  Delusion Type: Controlled    Suicide Risk CSSR-S:  1) Within the past month, have you wished you were dead or wished you could go to sleep and not wake up? : No  2) Have you actually had any thoughts of killing yourself? : No  6) Have you ever done anything, started to do anything, or prepared to do anything to end your life?: No  Change in Result no Change in Plan of care no      EDUCATION:   Learner Progress Toward Treatment Goals: Reviewed goals and plan of care    Method: Individual    Outcome: No evidence of Learning    PATIENT GOALS: color, see family and take care of people    PLAN/TREATMENT RECOMMENDATIONS UPDATE:Evaluate and treat    GOALS UPDATE:   Time frame for Short-Term Goals: 2 days      Dhiraj Bernstein RN

## 2020-02-10 ENCOUNTER — APPOINTMENT (OUTPATIENT)
Dept: GENERAL RADIOLOGY | Age: 80
DRG: 057 | End: 2020-02-10
Attending: PSYCHIATRY & NEUROLOGY
Payer: MEDICARE

## 2020-02-10 PROCEDURE — 1240000000 HC EMOTIONAL WELLNESS R&B

## 2020-02-10 PROCEDURE — 6370000000 HC RX 637 (ALT 250 FOR IP): Performed by: PSYCHIATRY & NEUROLOGY

## 2020-02-10 PROCEDURE — 6370000000 HC RX 637 (ALT 250 FOR IP): Performed by: PHYSICIAN ASSISTANT

## 2020-02-10 PROCEDURE — 99232 SBSQ HOSP IP/OBS MODERATE 35: CPT | Performed by: PSYCHIATRY & NEUROLOGY

## 2020-02-10 PROCEDURE — 73140 X-RAY EXAM OF FINGER(S): CPT

## 2020-02-10 PROCEDURE — 99232 SBSQ HOSP IP/OBS MODERATE 35: CPT | Performed by: PHYSICIAN ASSISTANT

## 2020-02-10 RX ORDER — CEPHALEXIN 500 MG/1
500 CAPSULE ORAL EVERY 12 HOURS SCHEDULED
Status: DISCONTINUED | OUTPATIENT
Start: 2020-02-10 | End: 2020-02-15 | Stop reason: HOSPADM

## 2020-02-10 RX ADMIN — MEMANTINE 10 MG: 5 TABLET ORAL at 19:14

## 2020-02-10 RX ADMIN — CEPHALEXIN 500 MG: 500 CAPSULE ORAL at 22:31

## 2020-02-10 RX ADMIN — DIVALPROEX SODIUM 250 MG: 250 TABLET, DELAYED RELEASE ORAL at 08:46

## 2020-02-10 RX ADMIN — DIVALPROEX SODIUM 500 MG: 500 TABLET, DELAYED RELEASE ORAL at 19:14

## 2020-02-10 RX ADMIN — MEMANTINE 10 MG: 5 TABLET ORAL at 08:46

## 2020-02-10 RX ADMIN — RISPERIDONE 0.25 MG: 0.25 TABLET ORAL at 19:14

## 2020-02-10 RX ADMIN — CEPHALEXIN 500 MG: 500 CAPSULE ORAL at 13:37

## 2020-02-10 RX ADMIN — RISPERIDONE 0.25 MG: 0.25 TABLET ORAL at 08:46

## 2020-02-10 RX ADMIN — FINASTERIDE 5 MG: 5 TABLET, FILM COATED ORAL at 08:46

## 2020-02-10 NOTE — BH NOTE
Patient up ambulating on unit this morning, asking staff about using the bathroom in his room. Instructed him that it was okay to use the bathroom located in his room and he verbalized understanding. Patient returned to his room and pulled his room door shut. Patient was later noticed standing at his room door asking staff for help to get out. When staff assisted patient with door, patient noted to be bleeding on his right ring finger around the nail bed. Patient had cut his finger when trying to pull his room door open instead of sliding the door. Area cleansed with water and approx. 1 inch laceration noted. Applied steri-strips to area and covered with band-aid. Patient denies any pain or discomfort and encouraged him to keep area covered.

## 2020-02-10 NOTE — PLAN OF CARE
Problem: Anger Management/Homicidal Ideation:  Goal: Able to display appropriate communication and problem solving  Description  Able to display appropriate communication and problem solving  2/10/2020 0108 by Luis Kong RN  Outcome: Ongoing       Problem: Altered Mood, Deterioration in Function:  Goal: Ability to perform activities of daily living will improve  Description  Ability to perform activities of daily living will improve  Outcome: Ongoing     Problem: Altered Mood, Deterioration in Function:  Goal: Maintenance of adequate nutrition will improve  Description  Maintenance of adequate nutrition will improve  Outcome: Ongoing     Problem: Safety:  Goal: Ability to remain free from injury will improve  Description  Ability to remain free from injury will improve  Outcome: Ongoing     Problem: Discharge Planning:  Goal: Ability to perform activities of daily living will improve  Description  Ability to perform activities of daily living will improve  Outcome: Ongoing     Problem: Mood - Altered:  Goal: Mood stable  Description  Mood stable  Outcome: Ongoing  Patient visible on unit earlier in shift. He sat in DR area with other patients and ate a snack and socialized. Patient does have difficulty at times putting thoughts together and expressing self. No s/s of RTIS noted. Pleasant and cooperative with care. Ambulates ad-adriane on unit, gait slow and steady. No angry outburst or behaviors exhibited this shift. No signs of exit-seeking observed. Currently sleeping well in bed, call light within reach.

## 2020-02-10 NOTE — PLAN OF CARE
[facility]  Day 3 Interdisciplinary Treatment Plan NOTE    Review Date & Time: 2/10/20 1043    Patient was not in treatment team    Admission Type:   Admission Type: Involuntary    Reason for admission:  Reason for Admission: Confused and agressive with staff at [address]  Estimated Length of Stay Update:  3-5 days  Estimated Discharge Date Update: 2/13/2020-2/15/2020    PATIENT STRENGTHS:  Patient Strengths Strengths: Social Skills, Positive Support  Patient Strengths and Limitations:Limitations: Multiple barriers to leisure interests, Difficulty problem solving/relies on others to help solve problems  Addictive Behavior:Addictive Behavior  In the past 3 months, have you felt or has someone told you that you have a problem with:  : (NIKKI, pt unwilling to meet with SW.)  Do you have a history of Chemical Use?: (NIKKI, pt unwilling to meet with SW.)  Do you have a history of Alcohol Use?: (NIKKI, pt unwilling to meet with SW.)  Do you have a history of Street Drug Abuse?: (NIKKI, pt unwilling to meet with SW.)  Histroy of Prescripton Drug Abuse?: (NIKKI, pt unwilling to meet with SW.)  Medical Problems:  Past Medical History:   Diagnosis Date    Cancer ([facility] [address]     throat cancer     Elevated PSA     Hepatitis     Hypertrophy (benign) of prostate     Laryngeal cancer ([facility] [address]     Pneumonia     Skin infection, bacterial     rash on face        Risk:  Fall RiskTotal: 77  Enrique Scale Enrique Scale Score: 21  BVC Total: 0  Change in scores none. Changes to plan of Care none    Status EXAM:   Status and Exam  Normal: No  Facial Expression: Brightened  Affect: Blunt  Level of Consciousness: Confused  Mood:Normal: No  Mood: Other (Comment)(Appeared pleasant during this shift.  Has difficulty expressing needs.)  Motor Activity:Normal: Yes  Motor Activity: Decreased  Interview Behavior: Cooperative  Preception: [place] to Person  Attention:Normal: No  Attention: Unable to Concentrate, Distractible  Thought Processes: (Difficulty putting thoughts together to express.)  Thought Content:Normal: No  Thought Content: Poverty of Content  Hallucinations: None(No s/s of RTIS)  Delusions: No  Delusions: Persecution  Memory:Normal: No  Memory: Poor Recent, Poor Remote  Insight and Judgment: No  Insight and Judgment: Poor Judgment, Poor Insight  Present Suicidal Ideation: No(Exhibits no s/s.)  Present Homicidal Ideation: No(Exhibits no s/s.)    Daily Assessment Last Entry:   Daily Sleep (WDL): Exceptions to WDL         Patient Currently in Pain: Denies  Daily Nutrition (WDL): Within Defined Limits    Patient Monitoring:  Frequency of Checks: 4 times per hour, close    Psychiatric Symptoms:   Depression Symptoms  Depression Symptoms: Impaired concentration  Anxiety Symptoms  Anxiety Symptoms: Generalized  Juliana Symptoms  Juliana Symptoms: No problems reported or observed.      Psychosis Symptoms  Delusion Type: Controlled    Suicide Risk CSSR-S:  1) Within the past month, have you wished you were dead or wished you could go to sleep and not wake up? : No  2) Have you actually had any thoughts of killing yourself? : No  6) Have you ever done anything, started to do anything, or prepared to do anything to end your life?: No  Change in Result none Change in Plan of care none      EDUCATION:   Learner Progress Toward Treatment Goals: Reviewed results and recommendations of this team    Method: Individual    Outcome: Verbalized understanding    PATIENT GOALS: wait for my sister to pick me up    PLAN/TREATMENT RECOMMENDATIONS UPDATE:continue treatment    GOALS UPDATE:   Time frame for Short-Term Goals: 2 days      Shira Ortez RN

## 2020-02-10 NOTE — BH NOTE
Shira Dominguez was invited and encouraged to attend [employer] Class. Pt declined invite, stating \"No, not today. I want to lay down and rest.\" Pt did not attend.     Portia San, [facility]

## 2020-02-10 NOTE — BH NOTE
Provider called the pt's daughter, Marcella Estrada, and left a VM requesting a call back.          JONAS Barriga
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
## 2020-02-10 NOTE — PROGRESS NOTES
Occupational Therapy  Attempted OT tx however, pt was off unit for x-ray per RN. Will follow-up as time permits.     Thank you,  Ni Counter, OTR/L  #[phone]

## 2020-02-10 NOTE — BH NOTE
DORY called pt's daughter, Kvng Bautista, to check in. Kvng Bautista stated she spoke with staff at [facility] and they stated they would prefer the pt to go to [facility] for observation for a couple of week since he has been started on a psych medications. Kvng Bautista stated she visited [facility] and has decided that she doesn't feel comfortable putting her dad there. Kvng Bautista stated that [facility] told her they are not really equipped and/or trained to work with dementia pt's, which was surprising to her. Kvng Bautista stated she attemped to reach [facility]; however they did not return her call. DORY gave her the cell phone # for  at [facility]. Kvng Bautista reported that she has hired a company to assist with finding an appropriate  facility that would best meet her father's needs; therefore she would prefer her father stay in the unit at [facility] for at least a few more days if possible instead of moving him back to [facility] to only move him again. In addition, Kvng Bautista stated she is even sure if [facility] would allow him to return if he doesn't go to [facility] first anyway. DORY agreed to update the doctor and would call her back with any questions and/or updates.          JONAS Barriga
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
## 2020-02-10 NOTE — PLAN OF CARE
Pt. Is pleasant and friendly, compliant with care, received medications, attending groups, no combative behaviors noted,  was incontinent of bowel x 1 loose stool. Good appetite, alert to self only.

## 2020-02-10 NOTE — PROGRESS NOTES
Department of Psychiatry  Attending Progress Note    Admission Date:    2/6/2020    Chief complaint / Reason for Admission:  Aggressive behavioral disturbance    Patient's chart was reviewed, case was discussed with nursing/OT/RT staff, and collaborated with  about the treatment plan. SUBJECTIVE:   Over last 24 hours:  Behavioral outbursts: No  Non-aggressive behavioral disturbance: No  Medication compliant: Yes  Need for seclusion/restraints: No  Sleeping adequately:  Yes  Appetite adequate: Yes  Attending groups: Yes    On today's interview:   Pt exhibited no behavioral disturbance over the weekend. No exit seeking, nor aggression. Was compliant with care, cooperative and calm with staff. On assessment today, pt was cooperative and calm. Pt mostly spoke about how he hurt his finger when trying to pull his room door open instead of sliding the door. Pt currently denies any pain or SE's of his medications at this time. Updated pt on working with his daughter Leela Cueto, and social work for finding placement that will best fit his needs. Pt verbalized agreement with plan, though it is clear that he doesn't fully understand his current situation due to his underlying dementia.     Progressing overall: Improved relative to behavior exhibited at NH  Suicidal ideation: Denies  Homicidal ideation: Denies  Medication side effects: No    ROS: Patient has new complaints: no    Current Medications Ordered:   cephALEXin  500 mg Oral 2 times per day    Tdap-Dtap  0.5 mL Intramuscular Once    risperiDONE  0.25 mg Oral 2 times per day    finasteride  5 mg Oral Daily    divalproex  250 mg Oral Daily    And    divalproex  500 mg Oral Daily    terazosin  10 mg Oral Daily    memantine  10 mg Oral 2 times per day      PRN Meds: acetaminophen, LORazepam **OR** LORazepam, haloperidol lactate **OR** haloperidol, traZODone, benztropine mesylate, magnesium hydroxide, aluminum & magnesium hydroxide-simethicone Objective:     PE:    BP 96/64   Pulse 77   Temp 98.3 °F (36.8 °C) (Oral)   Resp 16   Ht 6' (1.829 m)   Wt 175 lb 9.6 oz (79.7 kg)   SpO2 99%   BMI 23.82 kg/m²       Motor / Gait: no abnormalities noted, nml tone, no involuntary movements, normal gait and station     Mental Status Examination:    Appearance: WM, appears stated age, wearing hospital gown, fair grooming and hygiene  Behavior/Attitude toward examiner:  cooperative, attentive, good eye contact  Speech:  spontaneous, normal rate, normal volume, but decreased fluency, +word finding impairment  Mood: \"No complaints\"  Affect:  Mood congruent, euthymic  Thought processes:  Impoverished and tangential  Thought Content: Denies SI, denies HI, did not voice any paranoid delusions today, +confablation  Perceptions: Denies AVH, not RTIS  Attention: impaired  Abstraction: relatively concrete  Cognition: Average AISLINN, Alert and oriented to person only, immediate, recent and remote recall all impaired  Insight: Impaired insight   Judgment: Impaired judgment      LAB: Reviewed labs from last 24 hours      Diagnoses:   Primary Psychiatric (DSM V) Diagnosis: Major neurocognitive disorder due to Alzheimer's disease, moderate, with behavioral disturbance  Secondary Psychiatric (DSM V) Diagnoses: None known  Chemical Dependency Diagnoses: None known  Active Medical Diagnoses: Hepatitis A, history of laryngeal cancer     All conditions detailed above are being treated while patient is hospitalized.      Tx plan: Generally: prevent self injury/aggression, stabilize mood/anxiety/psychotic/behavioral disturbance, establish/maintain aftercare, increase coping mechanisms, improve medication compliance.  All conditions present on admission are being treated while pt is hospitalized.      [facility] Status[facility]     Primary Psychiatric Issues:  1.   Dementia with behavioral disturbance:  -Continue to monitor patient's behavior on unit to better clarify specific behavioral disturbance  -Modified timing of patient's medication to avoid need to wake patient for medications:              -Depakote 250 mg daily and 500 mg at 1700 (instead of QHS). -Memantine 10 mg daily and 1700  -Started risperidone 0.25 mg daily and 1700 on 2020. Currently pt appears to be tolerating. Discussed r/b/alt with daughter/POA including, but not limited to, black box warning. Consent given. -Prn haldol and lorazepam available.     Chemical Dependency Issues:  No issues.       Function:  -Consult physical therapy  -Consult occupational therapy  -Falls precautions     Medical Problems:  Internal medicine has been consulted. Appreciate recs.     Code Status: DNR-CC     Disposition:    Per pt's daughter, she again spoke with pt's current [facility] and it seems they remain profoundly resistant to any consideration of patient returning their despite our efforts to communicate with them and advocate on patient's behalf. As such, we will begin exploring alternative placement options, but we do intend to continue to advocate for patient and family.  -Housing: Assisted living facility.  -Current outpatient follow-up: PCP     Estimated length of stay: 3-5 days     Criteria for Discharge:  Not psychotic, not homicidal, not suicidal, behavioral disturbance under control, sleeping well, mood improved/stable, eating well, aftercare arranged. Total face to face time with patient was 30 minutes and more than 50 % of that time was spent counseling the patient on their symptoms, treatment and expected goals. Total time engaged in family counselin minutes    Transcribed by Yoselyn Chin.  PMHNP student     Jasson Kim MD  Staff Psychiatrist

## 2020-02-11 PROCEDURE — 99231 SBSQ HOSP IP/OBS SF/LOW 25: CPT | Performed by: PHYSICIAN ASSISTANT

## 2020-02-11 PROCEDURE — 1240000000 HC EMOTIONAL WELLNESS R&B

## 2020-02-11 PROCEDURE — 6370000000 HC RX 637 (ALT 250 FOR IP): Performed by: PSYCHIATRY & NEUROLOGY

## 2020-02-11 PROCEDURE — 6370000000 HC RX 637 (ALT 250 FOR IP): Performed by: PHYSICIAN ASSISTANT

## 2020-02-11 PROCEDURE — 99231 SBSQ HOSP IP/OBS SF/LOW 25: CPT | Performed by: PSYCHIATRY & NEUROLOGY

## 2020-02-11 RX ADMIN — TERAZOSIN HYDROCHLORIDE ANHYDROUS 10 MG: 5 CAPSULE ORAL at 08:53

## 2020-02-11 RX ADMIN — CEPHALEXIN 500 MG: 500 CAPSULE ORAL at 21:44

## 2020-02-11 RX ADMIN — MEMANTINE 10 MG: 5 TABLET ORAL at 08:52

## 2020-02-11 RX ADMIN — DIVALPROEX SODIUM 250 MG: 250 TABLET, DELAYED RELEASE ORAL at 08:53

## 2020-02-11 RX ADMIN — RISPERIDONE 0.25 MG: 0.25 TABLET ORAL at 17:47

## 2020-02-11 RX ADMIN — DIVALPROEX SODIUM 500 MG: 500 TABLET, DELAYED RELEASE ORAL at 17:47

## 2020-02-11 RX ADMIN — FINASTERIDE 5 MG: 5 TABLET, FILM COATED ORAL at 08:53

## 2020-02-11 RX ADMIN — MEMANTINE 10 MG: 5 TABLET ORAL at 17:47

## 2020-02-11 RX ADMIN — RISPERIDONE 0.25 MG: 0.25 TABLET ORAL at 08:53

## 2020-02-11 RX ADMIN — CEPHALEXIN 500 MG: 500 CAPSULE ORAL at 08:52

## 2020-02-11 NOTE — PLAN OF CARE
Problem: Anger Management/Homicidal Ideation:  Goal: Ability to verbalize frustrations and anger appropriately will improve  Description  Ability to verbalize frustrations and anger appropriately will improve  2/10/2020 2345 by Nicho Hair RN  Outcome: Ongoing  Pt was slightly irritable for a while this evening. He has difficulty concentrating. He will try to put together a story to tell but has difficulty remembering enough to complete his story. Later in shift pt was very cooperative. Problem: Injury - Risk of, Physical Injury:    Outcome: Ongoing Pt has had no falls this evening. He continues to have healing lac around right ring finger right had. Dressing in place. No c/o pain. Pt did not want to take his Keflex. Refused it earlier and was a bit irritiable. Later this seemed to clear and pt. Took his Keflex.

## 2020-02-11 NOTE — PROGRESS NOTES
Occupational Therapy  Pt politely refused OT tx this am, \"I am going home. I don't need anything. \" Will follow-up as time permits.     Thank you,    Driss Lawler, OTR/L  #[phone]

## 2020-02-11 NOTE — BH NOTE
Ludin Jacome was invited and encouraged to attend [employer]. Ludin Jacome declined invite, stating \"I don't have time. \" Ludin Jacome did not attend group.     Valdo Boucher, CTRS

## 2020-02-11 NOTE — BH NOTE
Patient is able to demonstrated the ability to move from a reclining position to an upright position within the recliner.

## 2020-02-11 NOTE — PROGRESS NOTES
Department of Psychiatry  Attending Progress Note    Admission Date:    2/6/2020    Chief complaint / Reason for Admission:  Aggressive behavioral disturbance    Patient's chart was reviewed, case was discussed with nursing/OT/RT staff, and collaborated with  about the treatment plan. SUBJECTIVE:   Over last 24 hours:  Behavioral outbursts: No  Non-aggressive behavioral disturbance: No  Medication compliant: Yes  Need for seclusion/restraints: No  Sleeping adequately:  Yes  Appetite adequate: Yes  Attending groups: Yes    On today's interview:   Pt continues to do well on the unit. Took medications without issue. Slept well overnight. Has not exhibited any aggressive behaviors. On interview, pt was pleasant, and discussed some of his past jobs. Denies pain or GI symptoms.     Progressing overall: Improved relative to behavior exhibited at NH  Suicidal ideation: Denies  Homicidal ideation: Denies  Medication side effects: No    ROS: Patient has new complaints: no    Current Medications Ordered:   cephALEXin  500 mg Oral 2 times per day    Tdap-Dtap  0.5 mL Intramuscular Once    risperiDONE  0.25 mg Oral 2 times per day    finasteride  5 mg Oral Daily    divalproex  250 mg Oral Daily    And    divalproex  500 mg Oral Daily    terazosin  10 mg Oral Daily    memantine  10 mg Oral 2 times per day      PRN Meds: acetaminophen, LORazepam **OR** LORazepam, haloperidol lactate **OR** haloperidol, traZODone, benztropine mesylate, magnesium hydroxide, aluminum & magnesium hydroxide-simethicone     Objective:     PE:    BP (!) 142/80   Pulse 75   Temp 97.1 °F (36.2 °C) (Oral)   Resp 16   Ht 6' (1.829 m)   Wt 175 lb 9.6 oz (79.7 kg)   SpO2 97%   BMI 23.82 kg/m²       Motor / Gait: no abnormalities noted, nml tone, no involuntary movements, normal gait and station     Mental Status Examination:    Appearance: WM, appears stated age, wearing hospital gown, fair grooming and hygiene  Behavior/Attitude toward examiner:  cooperative, attentive, good eye contact  Speech:  spontaneous, normal rate, normal volume, but decreased fluency, +word finding impairment  Mood:  \"Pretty good\"  Affect:  Mood congruent, euthymic  Thought processes:  Impoverished and tangential  Thought Content: Denies SI, denies HI, did not voice any paranoid delusions today, +confablation  Perceptions: Denies AVH, not RTIS  Attention: impaired  Abstraction: relatively concrete  Cognition: Average AISLINN, Alert and oriented to person only, immediate, recent and remote recall all impaired  Insight: Impaired insight   Judgment: Impaired judgment      LAB: Reviewed labs from last 24 hours      Diagnoses:   Primary Psychiatric (DSM V) Diagnosis: Major neurocognitive disorder due to Alzheimer's disease, moderate, with behavioral disturbance  Secondary Psychiatric (DSM V) Diagnoses: None known  Chemical Dependency Diagnoses: None known  Active Medical Diagnoses: Hepatitis A, history of laryngeal cancer     All conditions detailed above are being treated while patient is hospitalized.      Tx plan: Generally: prevent self injury/aggression, stabilize mood/anxiety/psychotic/behavioral disturbance, establish/maintain aftercare, increase coping mechanisms, improve medication compliance.  All conditions present on admission are being treated while pt is hospitalized.      Legal Status: Voluntary signed by pt's [facility] on 2/6/2020     Primary Psychiatric Issues:  1. Dementia with behavioral disturbance:  -Continue to monitor patient's behavior on unit to better clarify specific behavioral disturbance  -Modified timing of patient's medication to avoid need to wake patient for medications:              -Depakote 250 mg daily and 500 mg at 1700 (instead of QHS). -Memantine 10 mg daily and 1700  -Started risperidone 0.25 mg daily and 1700 on 2/7/2020. Currently pt appears to be tolerating.  Discussed r/b/alt with daughter/POA
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
## 2020-02-12 PROCEDURE — 6370000000 HC RX 637 (ALT 250 FOR IP): Performed by: PHYSICIAN ASSISTANT

## 2020-02-12 PROCEDURE — 6370000000 HC RX 637 (ALT 250 FOR IP): Performed by: PSYCHIATRY & NEUROLOGY

## 2020-02-12 PROCEDURE — 99231 SBSQ HOSP IP/OBS SF/LOW 25: CPT | Performed by: PSYCHIATRY & NEUROLOGY

## 2020-02-12 PROCEDURE — 99231 SBSQ HOSP IP/OBS SF/LOW 25: CPT | Performed by: PHYSICIAN ASSISTANT

## 2020-02-12 PROCEDURE — 92526 ORAL FUNCTION THERAPY: CPT

## 2020-02-12 PROCEDURE — 1240000000 HC EMOTIONAL WELLNESS R&B

## 2020-02-12 RX ADMIN — CEPHALEXIN 500 MG: 500 CAPSULE ORAL at 20:21

## 2020-02-12 RX ADMIN — TERAZOSIN HYDROCHLORIDE ANHYDROUS 10 MG: 5 CAPSULE ORAL at 08:13

## 2020-02-12 RX ADMIN — DIVALPROEX SODIUM 250 MG: 250 TABLET, DELAYED RELEASE ORAL at 08:14

## 2020-02-12 RX ADMIN — RISPERIDONE 0.25 MG: 0.25 TABLET ORAL at 16:21

## 2020-02-12 RX ADMIN — CEPHALEXIN 500 MG: 500 CAPSULE ORAL at 08:13

## 2020-02-12 RX ADMIN — FINASTERIDE 5 MG: 5 TABLET, FILM COATED ORAL at 08:14

## 2020-02-12 RX ADMIN — MEMANTINE 10 MG: 5 TABLET ORAL at 16:21

## 2020-02-12 RX ADMIN — RISPERIDONE 0.25 MG: 0.25 TABLET ORAL at 08:14

## 2020-02-12 RX ADMIN — MEMANTINE 10 MG: 5 TABLET ORAL at 08:14

## 2020-02-12 RX ADMIN — DIVALPROEX SODIUM 500 MG: 500 TABLET, DELAYED RELEASE ORAL at 16:21

## 2020-02-12 NOTE — PLAN OF CARE
Problem: Falls - Risk of:  Goal: Will remain free from falls  Outcome: Ongoing  Fall risk, armband on, bed alarm on, light on outside of room, call light within reach, bed in lowest/locked position.

## 2020-02-12 NOTE — BH NOTE
Pt A+O to self only. Pt sleeping in bed. After patient awoken, he was calm, cooperative and medication compliant. Pt denies SI/HI/AVH and no RTIS noted. Pt assisted, standby to the bathroom. Pt denied any other needs and is currently sleeping with the bed alarm on and snoring loudly. Will monitor.
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
## 2020-02-12 NOTE — PROGRESS NOTES
Speech Language Pathology  Facility/Department: [address] [place] [address]  Dysphagia Daily Treatment Note    NAME: Zoila Castillo  : 1940  MRN: [id number]    RECOMMENDATIONS  1. IDDSI level 6 soft and bite sized solids with thin liquids. 2. Monitor during intake given impulsivity  3. Monitor lungs closely  4. Safe swallow precautions   Small bites/sips   Slow rate   Alternate bite with drink  5. Hold PO and contact SLP if s/s of aspiration develop. Patient Diagnosis(es):   Patient Active Problem List    Diagnosis Date Noted    Crushing injury of right ring finger     Alzheimer's dementia with behavioral disturbance ([facility] [address] 2020    Hepatitis     Hx of laryngeal cancer     Laceration of forehead     Gross hematuria 2017    Calculus of kidney 2017    Retention of urine 2017    Dysuria 2017    Nocturia 2017    Urge incontinence 2017    Abdominal pain 2017    Benign prostatic hyperplasia 2017    Elevated prostate specific antigen (PSA) 2017    Impotence of organic origin 2017     Allergies: No Known Allergies  Onset Date: 2020  Current Diet Level:  Regular solids, thin liquids    Pain: None reported    Subjective: Pt was pleasant and agreeable to dysphagia tx. Current Diet: DIET GENERAL    Diet Tolerance:  Patient tolerating current diet level without signs/symptoms of aspiration. RN reports good tolerance of meds whole with liquid and no difficulty observed during meals this day. P.O. Trials: Thin   X  X 5 by cup   Nectar       Honey       Puree       Solid   X Pasta with chicken, peach cobbler       Dysphagia Treatment and Impressions: Pt seen for dysphagia tx with supper tray in dining room. Pt was very pleasant and talkative. Pt assessed with thin liquids by cup and solids (pasta). Per RN, pt's NH reported pt could not have pasta as recommended by ST at some point. Pt denies dysphagia.  Pt remains impulsive taking several bites at one time and speaking with food in oral cavity. Reduced oral clearance in b/w bites. Spaced retrieval completed with the phrase \"Make my mouth clean and clear\" after each bite. Pt benefited from reciting spaced retrieval phrase with increased self-monitoring after cues for slowed rate and to refrain from talking while eating were ineffective. Throat clear noted x2 during conversation during meal but does not appear to be directly related to po intake. Voice quality remains clear, no cough occurs, andand no lung symptoms have resulted in chest imaging being ordered to date. Pt was noted to exhibit throat clearing and subtle coughing prior to meal during last ST session. Pt appears to be tolerating diet without overt signs of aspiration per chart review, RN report, and pt report. RN educated in use of cueing and supervision to ensure no food is left on oral cavity. RN voices good understanding and reiterates that no signs of dysphagia have been observed. Dysphagia Goals:  Timeframe for Long-term Goals: 7 days (02/13/2020)  Goal 1: The patient will tolerate least restrictive diet with no clinical s/s of aspiration for optimal nutrition and hydration.-02/12-goal met. See above     Short-term Goals  Timeframe for Short-term Goals: 5 days (02/11/2020)  Goal 1: The patient will tolerate recommended diet with no clinical s/s of aspiration 5/5-  Goal 2: The patient/caregivers will [id number]-goal met. See abovee understanding of recommended compensatory swallow strategies, for improved swallow safety-02/12-goal met. See above     Recommendations:  Solid Consistency: IDDSI level 6 soft and bite sized solids  Liquid Consistency:  Thin liquids  Medication: Meds whole with thin liquid (1-2 at a time)    Patient/Family/Caregiver Education: Educated RN on diet recommendations    Compensatory Strategies:  Compensatory Swallowing Strategies:   - Alternate solids and liquids  - Small

## 2020-02-13 PROCEDURE — 6370000000 HC RX 637 (ALT 250 FOR IP): Performed by: PSYCHIATRY & NEUROLOGY

## 2020-02-13 PROCEDURE — 99233 SBSQ HOSP IP/OBS HIGH 50: CPT | Performed by: NURSE PRACTITIONER

## 2020-02-13 PROCEDURE — 6370000000 HC RX 637 (ALT 250 FOR IP): Performed by: PHYSICIAN ASSISTANT

## 2020-02-13 PROCEDURE — 99231 SBSQ HOSP IP/OBS SF/LOW 25: CPT | Performed by: PHYSICIAN ASSISTANT

## 2020-02-13 PROCEDURE — 1240000000 HC EMOTIONAL WELLNESS R&B

## 2020-02-13 RX ADMIN — MEMANTINE 10 MG: 5 TABLET ORAL at 08:14

## 2020-02-13 RX ADMIN — DIVALPROEX SODIUM 250 MG: 250 TABLET, DELAYED RELEASE ORAL at 08:14

## 2020-02-13 RX ADMIN — TERAZOSIN HYDROCHLORIDE ANHYDROUS 10 MG: 5 CAPSULE ORAL at 08:13

## 2020-02-13 RX ADMIN — FINASTERIDE 5 MG: 5 TABLET, FILM COATED ORAL at 08:14

## 2020-02-13 RX ADMIN — RISPERIDONE 0.25 MG: 0.25 TABLET ORAL at 15:35

## 2020-02-13 RX ADMIN — CEPHALEXIN 500 MG: 500 CAPSULE ORAL at 21:10

## 2020-02-13 RX ADMIN — RISPERIDONE 0.25 MG: 0.25 TABLET ORAL at 08:13

## 2020-02-13 RX ADMIN — MEMANTINE 10 MG: 5 TABLET ORAL at 15:35

## 2020-02-13 RX ADMIN — DIVALPROEX SODIUM 500 MG: 500 TABLET, DELAYED RELEASE ORAL at 16:37

## 2020-02-13 RX ADMIN — CEPHALEXIN 500 MG: 500 CAPSULE ORAL at 08:13

## 2020-02-13 NOTE — PROGRESS NOTES
Department of Psychiatry  Attending Progress Note    Admission Date:    2/6/2020    Chief complaint / Reason for Admission:  Aggressive behavioral disturbance    Patient's chart was reviewed, case was discussed with nursing/OT/RT staff, and collaborated with  about the treatment plan. SUBJECTIVE:   Over last 24 hours:  Behavioral outbursts: No  Non-aggressive behavioral disturbance: No  Medication compliant: Yes  Need for seclusion/restraints: No  Sleeping adequately:  Yes  Appetite adequate: Yes  Attending groups: Yes    On today's interview:     Pt continues to do well on unit, and has been appropriate with staff. Today, another patient was asking him a question, and pt responded with \"Kiss my ass. \" in a normal tone. No other behavioral disturbances noted, and has not exhibited any aggressive behaviors. Tolerates redirection well. Took medications without issue. Slept well overnight. On interview, pt was pleasant, and voiced no concerns. Remains oriented to self only and does not comprehend his current circumstances. Denies pain or GI symptoms.     Progressing overall: Improved relative to behavior exhibited at NH  Suicidal ideation: Denies  Homicidal ideation: Denies  Medication side effects: No    ROS: Patient has new complaints: no    Current Medications Ordered:   cephALEXin  500 mg Oral 2 times per day    Tdap-Dtap  0.5 mL Intramuscular Once    risperiDONE  0.25 mg Oral 2 times per day    finasteride  5 mg Oral Daily    divalproex  250 mg Oral Daily    And    divalproex  500 mg Oral Daily    terazosin  10 mg Oral Daily    memantine  10 mg Oral 2 times per day      PRN Meds: acetaminophen, LORazepam **OR** LORazepam, haloperidol lactate **OR** haloperidol, traZODone, benztropine mesylate, magnesium hydroxide, aluminum & magnesium hydroxide-simethicone     Objective:     PE:    BP (!) 151/78   Pulse 89   Temp 97.2 °F (36.2 °C) (Oral)   Resp 16   Ht 6' (1.829 m)   Wt 175 lb 9.6 medications:              -Depakote 250 mg daily and 500 mg at 1700 (instead of QHS). -Memantine 10 mg daily and 1700  -Started risperidone 0.25 mg daily and 1700 on 2/7/2020. Currently pt appears to be tolerating. Discussed r/b/alt with daughter/POA including, but not limited to, black box warning. Consent given. -Prn haldol and lorazepam available. Has not required to this point.     Chemical Dependency Issues:  No issues.       Function:  -Consult physical therapy  -Consult occupational therapy  -Falls precautions     Medical Problems:  Internal medicine has been consulted. Appreciate recs.     Code Status: DNR-CC     Disposition:    -Housing: Assisted living facility. At this time family is interested in pursuing an alternative disposition as patient's current facility appears incapable of managing patients with dementia. -Current outpatient follow-up: PCP     Estimated length of stay: 3-5 days     Criteria for Discharge:  Not psychotic, not homicidal, not suicidal, behavioral disturbance under control, sleeping well, mood improved/stable, eating well, aftercare arranged. Total face to face time with patient was 15 minutes and more than 50 % of that time was spent counseling the patient on their symptoms, treatment and expected goals.     Transcribed by Lila NAGYP, Student        Read and reviewed by Jordy Houston, MPH, APRN, PMMONIP-BC  02/13/20
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
## 2020-02-13 NOTE — BH NOTE
DORY received a call from pt's daughter, Shu Thompson. Shu Thompson reported she was informed by [facility] that they would not be able to accept the pt because he needs a higher level of care than they can offer. Shu Thompson was tearful and stated she feels that the pt has been more confused since coming to the unit. Shu Thompson stated she doesn't know how to move forward with pt because [facility] didn't have any other facilities in their area that would accept Medicaid once the pt qualifies for it. Shu Thompson  stated she is considering taking her dad home with her to [place] with home health services; explaining that her  works from home and her schedule is very flexible but she isn't sure if it is truly a feasible plan. DORY encouraged pt to follow up with Kandee Epley and gave her the information for [employer]. Dory discussed [address]; however it wasn't in the area nor did it accept Medicaid.              ERNIE Barriga-S

## 2020-02-13 NOTE — GROUP NOTE
Group Therapy Note    Date: 2/13/2020    Group Start Time: 4963  Group End Time: 9779  Group Topic: Recreational    [address]; [facility]    Notes: Beto Buckley was encouraged to participate in group activity. Beto Buckley declined encouragement. Beto Buckley was observed standing in group observing group activity for approximately 10 minutes. Beto Buckley left group and did not return.      Status After Intervention:  Unchanged    Participation Level: Minimal    Participation Quality: Uninterested      Speech:  normal      Thought Process/Content: Confused      Affective Functioning: Constricted/Restricted      Mood: euthymic      Level of consciousness:  Alert      Response to Learning: Resistant      Endings: None Reported    Modes of Intervention: Education, Socialization, Exploration and Activity      Discipline Responsible: Psychoeducational Specialist      Signature:  LAURITA Carter; AMADOR Barry

## 2020-02-14 PROCEDURE — 99231 SBSQ HOSP IP/OBS SF/LOW 25: CPT | Performed by: PHYSICIAN ASSISTANT

## 2020-02-14 PROCEDURE — 99233 SBSQ HOSP IP/OBS HIGH 50: CPT | Performed by: NURSE PRACTITIONER

## 2020-02-14 PROCEDURE — 1240000000 HC EMOTIONAL WELLNESS R&B

## 2020-02-14 PROCEDURE — 6370000000 HC RX 637 (ALT 250 FOR IP): Performed by: PSYCHIATRY & NEUROLOGY

## 2020-02-14 PROCEDURE — 6370000000 HC RX 637 (ALT 250 FOR IP): Performed by: PHYSICIAN ASSISTANT

## 2020-02-14 RX ADMIN — MEMANTINE 10 MG: 5 TABLET ORAL at 08:22

## 2020-02-14 RX ADMIN — TERAZOSIN HYDROCHLORIDE ANHYDROUS 10 MG: 5 CAPSULE ORAL at 08:21

## 2020-02-14 RX ADMIN — RISPERIDONE 0.25 MG: 0.25 TABLET ORAL at 08:22

## 2020-02-14 RX ADMIN — DIVALPROEX SODIUM 500 MG: 500 TABLET, DELAYED RELEASE ORAL at 16:25

## 2020-02-14 RX ADMIN — RISPERIDONE 0.25 MG: 0.25 TABLET ORAL at 16:25

## 2020-02-14 RX ADMIN — MEMANTINE 10 MG: 5 TABLET ORAL at 16:25

## 2020-02-14 RX ADMIN — CEPHALEXIN 500 MG: 500 CAPSULE ORAL at 21:56

## 2020-02-14 RX ADMIN — FINASTERIDE 5 MG: 5 TABLET, FILM COATED ORAL at 08:22

## 2020-02-14 RX ADMIN — CEPHALEXIN 500 MG: 500 CAPSULE ORAL at 08:22

## 2020-02-14 RX ADMIN — DIVALPROEX SODIUM 250 MG: 250 TABLET, DELAYED RELEASE ORAL at 08:21

## 2020-02-14 NOTE — PLAN OF CARE
Problem: Falls - Risk of:  Goal: Will remain free from falls  Outcome: Ongoing   Pt remains free from falls so far this shift. Fall preventions in place per unit policy.
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
## 2020-02-14 NOTE — BH NOTE
SW received a call from pt's daughter. Alec Murray reported that [facility] has encouraged them to follow up with memory care units versus assisted living units at this point. SW  discussed placement options with pt's daughter. SW encouraged the Alec Murray to follow up with Fl[facility], [address] and [address] of [place] [address]. Alec Murray stated those facilities are not on the list that was provided to her from [facility] but she should look into them; however she is hoping to find a place that will apply for & accept Medicaid when the pt's money runs out. DORY discussed possibility that she may need to look at alternative options for now and consider moving pt again in a few years when his money actually runs out. Alec Murray stated she is hoping not to have to do that. Tyson Pickens plans to look at several facilities this weekend.          JONAS Barriga

## 2020-02-14 NOTE — PROGRESS NOTES
traZODone, benztropine mesylate, magnesium hydroxide, aluminum & magnesium hydroxide-simethicone     Objective:     PE:    /80   Pulse 78   Temp 97.5 °F (36.4 °C) (Oral)   Resp 16   Ht 6' (1.829 m)   Wt 175 lb 9.6 oz (79.7 kg)   SpO2 94%   BMI 23.82 kg/m²       Motor / Gait: no abnormalities noted, nml tone, no involuntary movements, normal gait and station     Mental Status Examination:    Appearance: WM, appears stated age, wearing jeans and fleece pullover, fair grooming and hygiene  Behavior/Attitude toward examiner:  cooperative, attentive, good eye contact  Speech:  spontaneous, normal rate, normal volume, but decreased fluency, +word finding impairment  Mood:  \"Just like every other day\"  Affect:  Mood congruent, euthymic  Thought processes:  Impoverished and tangential  Thought Content: Denies SI, denies HI, did not voice any paranoid delusions today, +confablation  Perceptions: Denies AVH, not RTIS  Attention: impaired  Abstraction: relatively concrete  Cognition: Average AISLINN, Alert and oriented to person only, immediate, recent and remote recall all impaired  Insight: Impaired insight   Judgment: Impaired judgment      LAB: Reviewed labs from last 24 hours      Diagnoses:   Primary Psychiatric (DSM V) Diagnosis: Major neurocognitive disorder due to Alzheimer's disease, moderate, with behavioral disturbance  Secondary Psychiatric (DSM V) Diagnoses: None known  Chemical Dependency Diagnoses: None known  Active Medical Diagnoses: Hepatitis A, history of laryngeal cancer     All conditions detailed above are being treated while patient is hospitalized.      Tx plan: Generally: prevent self injury/aggression, stabilize mood/anxiety/psychotic/behavioral disturbance, establish/maintain aftercare, increase coping mechanisms, improve medication compliance.  All conditions present on admission are being treated while pt is hospitalized.      Legal Status[facility]

## 2020-02-14 NOTE — BH NOTE
SW received a call from pt's daughter and she requested SW send a referral to [address] [place] [address]. SW called admissions and left a VM requesting to know if they needed facesheert and/or a fully printed packet.            JONAS Barriga

## 2020-02-15 VITALS
HEART RATE: 75 BPM | BODY MASS INDEX: 23.78 KG/M2 | WEIGHT: 175.6 LBS | TEMPERATURE: 98 F | SYSTOLIC BLOOD PRESSURE: 128 MMHG | DIASTOLIC BLOOD PRESSURE: 82 MMHG | HEIGHT: 72 IN | RESPIRATION RATE: 16 BRPM | OXYGEN SATURATION: 97 %

## 2020-02-15 PROCEDURE — 99239 HOSP IP/OBS DSCHRG MGMT >30: CPT | Performed by: PSYCHIATRY & NEUROLOGY

## 2020-02-15 PROCEDURE — 5130000000 HC BRIDGE APPOINTMENT

## 2020-02-15 PROCEDURE — 6370000000 HC RX 637 (ALT 250 FOR IP): Performed by: PHYSICIAN ASSISTANT

## 2020-02-15 PROCEDURE — 6370000000 HC RX 637 (ALT 250 FOR IP): Performed by: PSYCHIATRY & NEUROLOGY

## 2020-02-15 RX ORDER — FINASTERIDE 5 MG/1
5 TABLET, FILM COATED ORAL DAILY
Qty: 30 TABLET | Refills: 0 | Status: SHIPPED | OUTPATIENT
Start: 2020-02-16 | End: 2020-03-17

## 2020-02-15 RX ORDER — TERAZOSIN 10 MG/1
10 CAPSULE ORAL DAILY
Qty: 30 CAPSULE | Refills: 0 | Status: SHIPPED | OUTPATIENT
Start: 2020-02-16 | End: 2020-03-17

## 2020-02-15 RX ORDER — DIVALPROEX SODIUM 500 MG/1
500 TABLET, DELAYED RELEASE ORAL DAILY
Qty: 30 TABLET | Refills: 0 | Status: SHIPPED | OUTPATIENT
Start: 2020-02-15 | End: 2020-03-16

## 2020-02-15 RX ORDER — RISPERIDONE 0.25 MG/1
0.25 TABLET, FILM COATED ORAL 2 TIMES DAILY
Qty: 60 TABLET | Refills: 0 | Status: SHIPPED | OUTPATIENT
Start: 2020-02-15 | End: 2020-03-16

## 2020-02-15 RX ORDER — DIVALPROEX SODIUM 250 MG/1
250 TABLET, DELAYED RELEASE ORAL DAILY
Qty: 30 TABLET | Refills: 0 | Status: SHIPPED | OUTPATIENT
Start: 2020-02-16 | End: 2020-03-17

## 2020-02-15 RX ORDER — MEMANTINE HYDROCHLORIDE 10 MG/1
10 TABLET ORAL 2 TIMES DAILY
Qty: 60 TABLET | Refills: 0 | Status: SHIPPED | OUTPATIENT
Start: 2020-02-15 | End: 2020-03-16

## 2020-02-15 RX ORDER — CEPHALEXIN 500 MG/1
500 CAPSULE ORAL EVERY 12 HOURS SCHEDULED
Qty: 6 CAPSULE | Refills: 0 | Status: SHIPPED | OUTPATIENT
Start: 2020-02-15 | End: 2020-02-18

## 2020-02-15 RX ORDER — TRAZODONE HYDROCHLORIDE 50 MG/1
25 TABLET ORAL NIGHTLY PRN
Qty: 15 TABLET | Refills: 0 | Status: SHIPPED | OUTPATIENT
Start: 2020-02-15 | End: 2020-03-16

## 2020-02-15 RX ADMIN — TERAZOSIN HYDROCHLORIDE ANHYDROUS 10 MG: 5 CAPSULE ORAL at 08:21

## 2020-02-15 RX ADMIN — MEMANTINE 10 MG: 5 TABLET ORAL at 08:19

## 2020-02-15 RX ADMIN — RISPERIDONE 0.25 MG: 0.25 TABLET ORAL at 08:19

## 2020-02-15 RX ADMIN — DIVALPROEX SODIUM 250 MG: 250 TABLET, DELAYED RELEASE ORAL at 08:18

## 2020-02-15 RX ADMIN — CEPHALEXIN 500 MG: 500 CAPSULE ORAL at 08:18

## 2020-02-15 RX ADMIN — FINASTERIDE 5 MG: 5 TABLET, FILM COATED ORAL at 08:19

## 2020-02-15 NOTE — PLAN OF CARE
Pt has remained free from falls and injury so far this shift. Med compliant with encouragement. A&O to self only. Confused but pleasant and cooperative. Continent. Attended groups. Denies SI/HI, AVH. No RTIS noted. Sitting in dayroom at this time. Will continue to monitor.

## 2020-02-15 NOTE — BH NOTE
Pt A+O to self only. Pt calm, pleasant, cooperative, and medication compliant. Pt resting/sleeping in bed. Pt denies SI/HI, NIKKI AVH, but no RTIS noted. When writer was leaving the room pt stated, \"Who loves you baby? \"  And he blew a kiss. Pt denies any needs at this time.
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
## 2023-03-10 NOTE — ED NOTES
Non adherent dressing placed over suture site.       Bandar Herring RN  02/06/20 0478 Rx Refill Note  Requested Prescriptions     Pending Prescriptions Disp Refills   • irbesartan (AVAPRO) 300 MG tablet [Pharmacy Med Name: Irbesartan Oral Tablet 300 MG] 15 tablet 0     Sig: TAKE 1 TABLET BY MOUTH EVERY DAY   • atorvastatin (LIPITOR) 20 MG tablet [Pharmacy Med Name: Atorvastatin Calcium Oral Tablet 20 MG] 15 tablet 0     Sig: Take 1 tablet by mouth Daily.      Last office visit with prescribing clinician: 10/3/2022   Last telemedicine visit with prescribing clinician: Visit date not found   Next office visit with prescribing clinician: Visit date not found                         Would you like a call back once the refill request has been completed: [] Yes [] No    If the office needs to give you a call back, can they leave a voicemail: [] Yes [] No    Chary Josue MA  03/10/23, 15:22 EST        Sent a 15 day RX to pharm. No more refills without an appointment.    Okay for hub relay message